# Patient Record
Sex: MALE | URBAN - NONMETROPOLITAN AREA
[De-identification: names, ages, dates, MRNs, and addresses within clinical notes are randomized per-mention and may not be internally consistent; named-entity substitution may affect disease eponyms.]

---

## 2022-02-18 ENCOUNTER — WEB ENCOUNTER (OUTPATIENT)
Dept: URBAN - NONMETROPOLITAN AREA CLINIC 13 | Facility: CLINIC | Age: 52
End: 2022-02-18

## 2022-02-18 ENCOUNTER — OFFICE VISIT (OUTPATIENT)
Dept: URBAN - NONMETROPOLITAN AREA CLINIC 13 | Facility: CLINIC | Age: 52
End: 2022-02-18
Payer: COMMERCIAL

## 2022-02-18 VITALS
TEMPERATURE: 97.5 F | HEART RATE: 77 BPM | WEIGHT: 213 LBS | BODY MASS INDEX: 28.23 KG/M2 | HEIGHT: 73 IN | DIASTOLIC BLOOD PRESSURE: 88 MMHG | SYSTOLIC BLOOD PRESSURE: 130 MMHG

## 2022-02-18 DIAGNOSIS — R53.83 FATIGUE, UNSPECIFIED TYPE: ICD-10-CM

## 2022-02-18 DIAGNOSIS — Z12.11 COLON CANCER SCREENING: ICD-10-CM

## 2022-02-18 DIAGNOSIS — R11.0 NAUSEA: ICD-10-CM

## 2022-02-18 DIAGNOSIS — K21.9 GASTROESOPHAGEAL REFLUX DISEASE, UNSPECIFIED WHETHER ESOPHAGITIS PRESENT: ICD-10-CM

## 2022-02-18 DIAGNOSIS — R74.8 ELEVATED LIVER ENZYMES: ICD-10-CM

## 2022-02-18 DIAGNOSIS — K92.1 MELENA: ICD-10-CM

## 2022-02-18 PROCEDURE — 99244 OFF/OP CNSLTJ NEW/EST MOD 40: CPT | Performed by: INTERNAL MEDICINE

## 2022-02-18 PROCEDURE — 99204 OFFICE O/P NEW MOD 45 MIN: CPT | Performed by: INTERNAL MEDICINE

## 2022-02-18 RX ORDER — ESOMEPRAZOLE MAGNESIUM 20 MG/1
1 CAPSULE CAPSULE, DELAYED RELEASE ORAL ONCE A DAY
Qty: 90 CAPSULE | Refills: 3 | OUTPATIENT
Start: 2022-02-18

## 2022-02-18 RX ORDER — PANTOPRAZOLE SODIUM 20 MG/1
1 TABLET TABLET, DELAYED RELEASE ORAL BID
Status: ACTIVE | COMMUNITY

## 2022-02-18 RX ORDER — ROSUVASTATIN CALCIUM 40 MG/1
1 TABLET TABLET, FILM COATED ORAL ONCE A DAY
Status: ACTIVE | COMMUNITY

## 2022-02-18 NOTE — HPI-TODAY'S VISIT:
Kenn is a 51-year-old male who is referred to me by Dr. Matt Chavez for consultation of nausea, melena, fatigue, and elevated liver enzymes.  A copy of this note will be sent to the referring physician.  In August 2021 he had an MI in the LAD status post stent placement at [place].  He is on Plavix and baby aspirin.  December 20 he developed acute chest pain and went to the emergency department.  His cardiac work-up was negative he was treated with pantoprazole and GI cocktail with relief.  He was discharged and continue to take pantoprazole but had increased symptoms.  His pantoprazole dose was increased to 40 mg daily.  After increasing his dose he had increased fatigue, malaise, and intermittent blurry vision.  He continues to have symptoms that waxed and waned over the month.  He was recommended for EGD but did not feel comfortable discontinuing his Plavix as it had not been a year since his stent placement.  His symptoms of fatigue and malaise became severe in February.  He went to the emergency room twice.  His troponins were elevated however these decreased.  This past Saturday he did test positive for Covid.  His ALT is mildly elevated.  He did have a contrasted CT of his abdomen and pelvis with fatty liver, normal gallbladder, normal pancreas, normal bowel, along with mild constipation.  During this work-up in early February he was noted to have some black stools.  He today his main complaint is fatigue, malaise, dyspepsia was taking Pepto-Bismol initially however he discontinued this and the black stools continued.  His blood work shows no anemia and his BUN is normal..  He is only using the pantoprazole as needed.  He did test positive for Covid this past Saturday.  Today he has multiple systemic complaints and is not feeling well.  MB

## 2022-02-21 LAB
A/G RATIO: 1.9
ACTIN (SMOOTH MUSCLE) ANTIBODY: 8
ALBUMIN: 4.9
ALKALINE PHOSPHATASE: 72
ALPHA-1-ANTITRYPSIN, SERUM: 149
ALT (SGPT): 48
ANA DIRECT: NEGATIVE
AST (SGOT): 28
BASO (ABSOLUTE): 0.1
BASOS: 1
BILIRUBIN, TOTAL: 0.7
BUN/CREATININE RATIO: 6
BUN: 7
CALCIUM: 10
CARBON DIOXIDE, TOTAL: 20
CHLORIDE: 96
CREATININE: 1.14
DEAMIDATED GLIADIN ABS, IGA: 5
DEAMIDATED GLIADIN ABS, IGG: 2
EGFR IF AFRICN AM: 87
EGFR IF NONAFRICN AM: 75
ENDOMYSIAL ANTIBODY IGA: NEGATIVE
EOS (ABSOLUTE): 0.1
EOS: 1
FERRITIN, SERUM: 341
GGT: 34
GLOBULIN, TOTAL: 2.6
GLUCOSE: 102
H PYLORI, IGM ABS: <9
H. PYLORI, IGA ABS: <9
HBSAG SCREEN: NEGATIVE
HCV AB: 0.1
HEMATOCRIT: 46.5
HEMATOLOGY COMMENTS:: (no result)
HEMOGLOBIN: 15.7
HEP A AB, IGM: NEGATIVE
HEP B CORE AB, IGM: NEGATIVE
IMMATURE CELLS: (no result)
IMMATURE GRANS (ABS): 0
IMMATURE GRANULOCYTES: 0
IMMUNOGLOBULIN A, QN, SERUM: 259
INR: 0.9
INTERPRETATION:: (no result)
IRON BIND.CAP.(TIBC): 324
IRON SATURATION: 37
IRON: 121
LYMPHS (ABSOLUTE): 1.4
LYMPHS: 14
MCH: 30.4
MCHC: 33.8
MCV: 90
MITOCHONDRIAL (M2) ANTIBODY: <20
MONOCYTES(ABSOLUTE): 0.8
MONOCYTES: 8
NEUTROPHILS (ABSOLUTE): 7.8
NEUTROPHILS: 76
NRBC: (no result)
PLATELETS: 336
POTASSIUM: 4.6
PROTEIN, TOTAL: 7.5
PROTHROMBIN TIME: 9.8
RBC: 5.17
RDW: 13.1
SEDIMENTATION RATE-WESTERGREN: 4
SODIUM: 135
T-TRANSGLUTAMINASE (TTG) IGA: <2
T-TRANSGLUTAMINASE (TTG) IGG: <2
T4,FREE(DIRECT): 1.57
TSH: 1.67
UIBC: 203
VITAMIN B12: 475
WBC: 10.2

## 2022-02-22 ENCOUNTER — WEB ENCOUNTER (OUTPATIENT)
Dept: URBAN - NONMETROPOLITAN AREA CLINIC 13 | Facility: CLINIC | Age: 52
End: 2022-02-22

## 2022-02-22 ENCOUNTER — TELEPHONE ENCOUNTER (OUTPATIENT)
Dept: URBAN - METROPOLITAN AREA CLINIC 92 | Facility: CLINIC | Age: 52
End: 2022-02-22

## 2022-02-22 ENCOUNTER — TELEPHONE ENCOUNTER (OUTPATIENT)
Dept: URBAN - NONMETROPOLITAN AREA CLINIC 13 | Facility: CLINIC | Age: 52
End: 2022-02-22

## 2022-02-22 RX ORDER — SUCRALFATE 1 G/1
1 TABLET ON AN EMPTY STOMACH - NOT WITH IN 1 HOUR OF OTHER MEDICATIONS TABLET ORAL TWICE A DAY
Qty: 60 TABLETS | Refills: 11 | OUTPATIENT
Start: 2022-02-22 | End: 2023-02-17

## 2022-02-22 RX ORDER — LANSOPRAZOLE 15 MG/1
1 TABLET TABLET, ORALLY DISINTEGRATING, DELAYED RELEASE ORAL BID
Qty: 60 TABLET | Refills: 6 | OUTPATIENT
Start: 2022-02-22

## 2022-02-22 RX ORDER — ESOMEPRAZOLE MAGNESIUM 20 MG/1
1 CAPSULE CAPSULE, DELAYED RELEASE ORAL BID
Qty: 180 CAPSULE | Refills: 3
Start: 2022-02-18

## 2022-02-23 ENCOUNTER — TELEPHONE ENCOUNTER (OUTPATIENT)
Dept: URBAN - NONMETROPOLITAN AREA CLINIC 13 | Facility: CLINIC | Age: 52
End: 2022-02-23

## 2022-02-25 ENCOUNTER — WEB ENCOUNTER (OUTPATIENT)
Dept: URBAN - NONMETROPOLITAN AREA CLINIC 13 | Facility: CLINIC | Age: 52
End: 2022-02-25

## 2022-02-28 ENCOUNTER — WEB ENCOUNTER (OUTPATIENT)
Dept: URBAN - NONMETROPOLITAN AREA CLINIC 13 | Facility: CLINIC | Age: 52
End: 2022-02-28

## 2022-03-01 ENCOUNTER — WEB ENCOUNTER (OUTPATIENT)
Dept: URBAN - NONMETROPOLITAN AREA CLINIC 13 | Facility: CLINIC | Age: 52
End: 2022-03-01

## 2022-03-02 ENCOUNTER — WEB ENCOUNTER (OUTPATIENT)
Dept: URBAN - NONMETROPOLITAN AREA CLINIC 13 | Facility: CLINIC | Age: 52
End: 2022-03-02

## 2022-03-09 ENCOUNTER — WEB ENCOUNTER (OUTPATIENT)
Dept: URBAN - NONMETROPOLITAN AREA CLINIC 13 | Facility: CLINIC | Age: 52
End: 2022-03-09

## 2022-03-18 ENCOUNTER — WEB ENCOUNTER (OUTPATIENT)
Dept: URBAN - NONMETROPOLITAN AREA CLINIC 13 | Facility: CLINIC | Age: 52
End: 2022-03-18

## 2022-03-20 ENCOUNTER — WEB ENCOUNTER (OUTPATIENT)
Dept: URBAN - NONMETROPOLITAN AREA CLINIC 13 | Facility: CLINIC | Age: 52
End: 2022-03-20

## 2022-03-28 ENCOUNTER — WEB ENCOUNTER (OUTPATIENT)
Dept: URBAN - NONMETROPOLITAN AREA CLINIC 13 | Facility: CLINIC | Age: 52
End: 2022-03-28

## 2022-03-28 ENCOUNTER — WEB ENCOUNTER (OUTPATIENT)
Dept: URBAN - NONMETROPOLITAN AREA CLINIC 2 | Facility: CLINIC | Age: 52
End: 2022-03-28

## 2022-03-30 LAB
A/G RATIO: 2
ALBUMIN: 4.5
ALKALINE PHOSPHATASE: 74
ALT (SGPT): 30
AST (SGOT): 26
BASO (ABSOLUTE): 0.1
BASOS: 1
BILIRUBIN, TOTAL: 0.4
BUN/CREATININE RATIO: 7
BUN: 7
CALCIUM: 9.9
CARBON DIOXIDE, TOTAL: 23
CHLORIDE: 94
CREATININE: 1.03
EGFR: 89
EOS (ABSOLUTE): 0.2
EOS: 2
GLOBULIN, TOTAL: 2.3
GLUCOSE: 68
HEMATOCRIT: 42.6
HEMATOLOGY COMMENTS:: (no result)
HEMOGLOBIN: 14.6
IMMATURE CELLS: (no result)
IMMATURE GRANS (ABS): 0.1
IMMATURE GRANULOCYTES: 1
LYMPHS (ABSOLUTE): 1.2
LYMPHS: 13
MCH: 31
MCHC: 34.3
MCV: 90
MONOCYTES(ABSOLUTE): 0.8
MONOCYTES: 8
NEUTROPHILS (ABSOLUTE): 7.1
NEUTROPHILS: 75
NRBC: (no result)
PLATELETS: 297
POTASSIUM: 4.6
PROTEIN, TOTAL: 6.8
RBC: 4.71
RDW: 12.5
SODIUM: 134
WBC: 9.5

## 2022-04-12 ENCOUNTER — TELEPHONE ENCOUNTER (OUTPATIENT)
Dept: URBAN - NONMETROPOLITAN AREA CLINIC 13 | Facility: CLINIC | Age: 52
End: 2022-04-12

## 2022-04-15 ENCOUNTER — OFFICE VISIT (OUTPATIENT)
Dept: URBAN - NONMETROPOLITAN AREA CLINIC 2 | Facility: CLINIC | Age: 52
End: 2022-04-15
Payer: COMMERCIAL

## 2022-04-15 VITALS
BODY MASS INDEX: 28.49 KG/M2 | SYSTOLIC BLOOD PRESSURE: 144 MMHG | WEIGHT: 215 LBS | HEART RATE: 69 BPM | DIASTOLIC BLOOD PRESSURE: 88 MMHG | HEIGHT: 73 IN | TEMPERATURE: 97.5 F

## 2022-04-15 DIAGNOSIS — K92.1 MELENA: ICD-10-CM

## 2022-04-15 DIAGNOSIS — Z12.11 COLON CANCER SCREENING: ICD-10-CM

## 2022-04-15 DIAGNOSIS — R11.0 NAUSEA: ICD-10-CM

## 2022-04-15 DIAGNOSIS — R19.4 CHANGE IN BOWEL HABITS: ICD-10-CM

## 2022-04-15 DIAGNOSIS — R53.83 FATIGUE, UNSPECIFIED TYPE: ICD-10-CM

## 2022-04-15 DIAGNOSIS — K21.9 GASTROESOPHAGEAL REFLUX DISEASE, UNSPECIFIED WHETHER ESOPHAGITIS PRESENT: ICD-10-CM

## 2022-04-15 DIAGNOSIS — R74.8 ELEVATED LIVER ENZYMES: ICD-10-CM

## 2022-04-15 PROCEDURE — 99214 OFFICE O/P EST MOD 30 MIN: CPT | Performed by: NURSE PRACTITIONER

## 2022-04-15 RX ORDER — SUCRALFATE 1 G/1
1 TABLET ON AN EMPTY STOMACH - NOT WITH IN 1 HOUR OF OTHER MEDICATIONS TABLET ORAL TWICE A DAY
Qty: 60 TABLETS | Refills: 11 | Status: ON HOLD | COMMUNITY
Start: 2022-02-22 | End: 2023-02-17

## 2022-04-15 RX ORDER — LANSOPRAZOLE 15 MG/1
1 TABLET TABLET, ORALLY DISINTEGRATING, DELAYED RELEASE ORAL BID
Qty: 60 TABLET | Refills: 6 | Status: ON HOLD | COMMUNITY
Start: 2022-02-22

## 2022-04-15 RX ORDER — ROSUVASTATIN CALCIUM 40 MG/1
1 TABLET TABLET, FILM COATED ORAL ONCE A DAY
Status: ACTIVE | COMMUNITY

## 2022-04-15 RX ORDER — PANTOPRAZOLE SODIUM 20 MG/1
1 TABLET TABLET, DELAYED RELEASE ORAL BID
Status: ACTIVE | COMMUNITY

## 2022-04-15 RX ORDER — ESOMEPRAZOLE MAGNESIUM 20 MG/1
1 CAPSULE CAPSULE, DELAYED RELEASE ORAL BID
Qty: 180 CAPSULE | Refills: 3 | Status: ON HOLD | COMMUNITY
Start: 2022-02-18

## 2022-04-15 NOTE — HPI-TODAY'S VISIT:
Kenn is a 51-year-old male who is referred to me by Dr. Matt Chavez for consultation of nausea, melena, fatigue, and elevated liver enzymes.  A copy of this note will be sent to the referring physician.  In August 2021 he had an MI in the LAD status post stent placement at [place].  He is on Plavix and baby aspirin.  December 20 he developed acute chest pain and went to the emergency department.  His cardiac work-up was negative he was treated with pantoprazole and GI cocktail with relief.  He was discharged and continue to take pantoprazole but had increased symptoms.  His pantoprazole dose was increased to 40 mg daily.  After increasing his dose he had increased fatigue, malaise, and intermittent blurry vision.  He continues to have symptoms that waxed and waned over the month.  He was recommended for EGD but did not feel comfortable discontinuing his Plavix as it had not been a year since his stent placement.  His symptoms of fatigue and malaise became severe in February.  He went to the emergency room twice.  His troponins were elevated however these decreased.  This past Saturday he did test positive for Covid.  His ALT is mildly elevated.  He did have a contrasted CT of his abdomen and pelvis with fatty liver, normal gallbladder, normal pancreas, normal bowel, along with mild constipation.  During this work-up in early February he was noted to have some black stools.  He today his main complaint is fatigue, malaise, dyspepsia was taking Pepto-Bismol initially however he discontinued this and the black stools continued.  His blood work shows no anemia and his BUN is normal..  He is only using the pantoprazole as needed.  He did test positive for Covid this past Saturday.  Today he has multiple systemic complaints and is not feeling well.  MB 4/15/2022 Kenn presents for follow-up of reflux, nausea, abdominal pain, and melena.  Since his last visit we tried multiple PPIs and he felt that he reacted to most of these.  His dyspepsia and epigastric pain improved on high-dose Protonix but he felt that he had systemic symptoms.  Since his last visit he did dose reduce his statin, his fatigue and malaise has improved.  He has had no further melena.  He remains on aspirin and Plavix for coronary artery disease and hopefully going to be able to come off in August 2022.  His bowels are still somewhat irregular.  We have discussed that this may be a side effect of the medications that he has to take for his heart.  Today he is feeling better than he has in some time.  He does plan to establish care locally with cardiology and Dr. Hemphill.  He does want to discuss EGD and colonoscopy at his follow-up visit.  MB

## 2022-06-13 ENCOUNTER — TELEPHONE ENCOUNTER (OUTPATIENT)
Dept: URBAN - NONMETROPOLITAN AREA CLINIC 13 | Facility: CLINIC | Age: 52
End: 2022-06-13

## 2022-06-21 ENCOUNTER — LAB OUTSIDE AN ENCOUNTER (OUTPATIENT)
Dept: URBAN - NONMETROPOLITAN AREA CLINIC 2 | Facility: CLINIC | Age: 52
End: 2022-06-21

## 2022-06-21 ENCOUNTER — OFFICE VISIT (OUTPATIENT)
Dept: URBAN - NONMETROPOLITAN AREA CLINIC 2 | Facility: CLINIC | Age: 52
End: 2022-06-21
Payer: COMMERCIAL

## 2022-06-21 VITALS
HEIGHT: 73 IN | HEART RATE: 62 BPM | TEMPERATURE: 97.6 F | SYSTOLIC BLOOD PRESSURE: 129 MMHG | DIASTOLIC BLOOD PRESSURE: 86 MMHG | WEIGHT: 220 LBS | BODY MASS INDEX: 29.16 KG/M2

## 2022-06-21 DIAGNOSIS — R53.83 FATIGUE, UNSPECIFIED TYPE: ICD-10-CM

## 2022-06-21 DIAGNOSIS — R11.0 NAUSEA: ICD-10-CM

## 2022-06-21 DIAGNOSIS — R74.8 ELEVATED LIVER ENZYMES: ICD-10-CM

## 2022-06-21 DIAGNOSIS — Z12.11 COLON CANCER SCREENING: ICD-10-CM

## 2022-06-21 DIAGNOSIS — R19.4 CHANGE IN BOWEL HABITS: ICD-10-CM

## 2022-06-21 DIAGNOSIS — K21.9 GASTROESOPHAGEAL REFLUX DISEASE, UNSPECIFIED WHETHER ESOPHAGITIS PRESENT: ICD-10-CM

## 2022-06-21 DIAGNOSIS — K92.1 MELENA: ICD-10-CM

## 2022-06-21 PROCEDURE — 99214 OFFICE O/P EST MOD 30 MIN: CPT | Performed by: NURSE PRACTITIONER

## 2022-06-21 RX ORDER — SUCRALFATE 1 G/1
1 TABLET ON AN EMPTY STOMACH - NOT WITH IN 1 HOUR OF OTHER MEDICATIONS TABLET ORAL TWICE A DAY
Qty: 60 TABLETS | Refills: 11 | Status: ON HOLD | COMMUNITY
Start: 2022-02-22 | End: 2023-02-17

## 2022-06-21 RX ORDER — ROSUVASTATIN CALCIUM 40 MG/1
1 TABLET TABLET, FILM COATED ORAL ONCE A DAY
Status: ACTIVE | COMMUNITY

## 2022-06-21 RX ORDER — LANSOPRAZOLE 15 MG/1
1 TABLET TABLET, ORALLY DISINTEGRATING, DELAYED RELEASE ORAL BID
Qty: 60 TABLET | Refills: 6 | Status: ON HOLD | COMMUNITY
Start: 2022-02-22

## 2022-06-21 RX ORDER — ESOMEPRAZOLE MAGNESIUM 20 MG/1
1 CAPSULE CAPSULE, DELAYED RELEASE ORAL BID
Qty: 180 CAPSULE | Refills: 3 | Status: ON HOLD | COMMUNITY
Start: 2022-02-18

## 2022-06-21 RX ORDER — PANTOPRAZOLE SODIUM 40 MG/1
AS DIRECTED TABLET, DELAYED RELEASE ORAL BID
Status: ACTIVE | COMMUNITY

## 2022-06-21 NOTE — HPI-TODAY'S VISIT:
Kenn is a 51-year-old male who is referred to me by Dr. Matt Chavez for consultation of nausea, melena, fatigue, and elevated liver enzymes.  A copy of this note will be sent to the referring physician.  In August 2021 he had an MI in the LAD status post stent placement at [place].  He is on Plavix and baby aspirin.  December 20 he developed acute chest pain and went to the emergency department.  His cardiac work-up was negative he was treated with pantoprazole and GI cocktail with relief.  He was discharged and continue to take pantoprazole but had increased symptoms.  His pantoprazole dose was increased to 40 mg daily.  After increasing his dose he had increased fatigue, malaise, and intermittent blurry vision.  He continues to have symptoms that waxed and waned over the month.  He was recommended for EGD but did not feel comfortable discontinuing his Plavix as it had not been a year since his stent placement.  His symptoms of fatigue and malaise became severe in February.  He went to the emergency room twice.  His troponins were elevated however these decreased.  This past Saturday he did test positive for Covid.  His ALT is mildly elevated.  He did have a contrasted CT of his abdomen and pelvis with fatty liver, normal gallbladder, normal pancreas, normal bowel, along with mild constipation.  During this work-up in early February he was noted to have some black stools.  He today his main complaint is fatigue, malaise, dyspepsia was taking Pepto-Bismol initially however he discontinued this and the black stools continued.  His blood work shows no anemia and his BUN is normal..  He is only using the pantoprazole as needed.  He did test positive for Covid this past Saturday.  Today he has multiple systemic complaints and is not feeling well.  MB 4/15/2022 Kenn presents for follow-up of reflux, nausea, abdominal pain, and melena.  Since his last visit we tried multiple PPIs and he felt that he reacted to most of these.  His dyspepsia and epigastric pain improved on high-dose Protonix but he felt that he had systemic symptoms.  Since his last visit he did dose reduce his statin, his fatigue and malaise has improved.  He has had no further melena.  He remains on aspirin and Plavix for coronary artery disease and hopefully going to be able to come off in August 2022.  His bowels are still somewhat irregular.  We have discussed that this may be a side effect of the medications that he has to take for his heart.  Today he is feeling better than he has in some time.  He does plan to establish care locally with cardiology and Dr. Hemphill.  He does want to discuss EGD and colonoscopy at his follow-up visit.  MB 6/21/2022 Kenn presents for evaluation of increased epigastric abdominal pain and reflux.  A month ago he developed a sinus infection.  He was taking over-the-counter sinus medications and developed increased epigastric abdominal pain and dizziness.  He was finally treated with antibiotics for his sinus infection but he continues to have epigastric discomfort.  He started pantoprazole 40 mg daily and over the past 3 days has increased to twice daily.  He does think this is helped his symptoms.  He does report increased fatigue and dizziness on the higher dose.  He is 2 months away from his 1 year anniversary of his stent placement.  He does not want to consider pursuing upper endoscopy given his chronic upper GI complaints.  He agrees to wean the pantoprazole to 20 mg twice daily to try and reduce the dizziness and proceed with EGD once Dr. Hemphill has cleared him to come off of his Plavix.  MB

## 2022-06-22 ENCOUNTER — TELEPHONE ENCOUNTER (OUTPATIENT)
Dept: URBAN - METROPOLITAN AREA CLINIC 92 | Facility: CLINIC | Age: 52
End: 2022-06-22

## 2022-06-22 ENCOUNTER — WEB ENCOUNTER (OUTPATIENT)
Dept: URBAN - NONMETROPOLITAN AREA CLINIC 2 | Facility: CLINIC | Age: 52
End: 2022-06-22

## 2022-06-22 ENCOUNTER — TELEPHONE ENCOUNTER (OUTPATIENT)
Dept: URBAN - NONMETROPOLITAN AREA CLINIC 13 | Facility: CLINIC | Age: 52
End: 2022-06-22

## 2022-06-22 LAB
A/G RATIO: 2
ALBUMIN: 4.7
ALKALINE PHOSPHATASE: 65
ALT (SGPT): 25
AST (SGOT): 20
BASO (ABSOLUTE): 0.1
BASOS: 1
BILIRUBIN, TOTAL: 0.8
BUN/CREATININE RATIO: 8
BUN: 10
C-REACTIVE PROTEIN, QUANT: <1
CALCIUM: 9.9
CARBON DIOXIDE, TOTAL: 25
CHLORIDE: 99
CREATININE: 1.27
EGFR: 69
EOS (ABSOLUTE): 0.5
EOS: 4
GLOBULIN, TOTAL: 2.3
GLUCOSE: 86
HEMATOCRIT: 46.7
HEMATOLOGY COMMENTS:: (no result)
HEMOGLOBIN: 15.2
IMMATURE CELLS: (no result)
IMMATURE GRANS (ABS): 0
IMMATURE GRANULOCYTES: 0
LYMPHS (ABSOLUTE): 1.3
LYMPHS: 11
MCH: 30.1
MCHC: 32.5
MCV: 93
MONOCYTES(ABSOLUTE): 0.7
MONOCYTES: 6
NEUTROPHILS (ABSOLUTE): 9.2
NEUTROPHILS: 78
NRBC: (no result)
PLATELETS: 293
POTASSIUM: 4.8
PROTEIN, TOTAL: 7
RBC: 5.05
RDW: 12
SEDIMENTATION RATE-WESTERGREN: 2
SODIUM: 137
WBC: 11.7

## 2022-07-06 ENCOUNTER — WEB ENCOUNTER (OUTPATIENT)
Dept: URBAN - NONMETROPOLITAN AREA CLINIC 2 | Facility: CLINIC | Age: 52
End: 2022-07-06

## 2022-07-21 ENCOUNTER — WEB ENCOUNTER (OUTPATIENT)
Dept: URBAN - NONMETROPOLITAN AREA CLINIC 2 | Facility: CLINIC | Age: 52
End: 2022-07-21

## 2022-09-16 ENCOUNTER — OFFICE VISIT (OUTPATIENT)
Dept: URBAN - NONMETROPOLITAN AREA CLINIC 2 | Facility: CLINIC | Age: 52
End: 2022-09-16

## 2022-10-25 ENCOUNTER — OFFICE VISIT (OUTPATIENT)
Dept: URBAN - NONMETROPOLITAN AREA SURGERY CENTER 1 | Facility: SURGERY CENTER | Age: 52
End: 2022-10-25
Payer: COMMERCIAL

## 2022-10-25 DIAGNOSIS — K31.89 ACQUIRED DEFORMITY OF DUODENUM: ICD-10-CM

## 2022-10-25 DIAGNOSIS — K22.89 DILATATION OF ESOPHAGUS: ICD-10-CM

## 2022-10-25 PROCEDURE — G8907 PT DOC NO EVENTS ON DISCHARG: HCPCS | Performed by: INTERNAL MEDICINE

## 2022-10-25 PROCEDURE — 43239 EGD BIOPSY SINGLE/MULTIPLE: CPT | Performed by: INTERNAL MEDICINE

## 2022-12-06 ENCOUNTER — OFFICE VISIT (OUTPATIENT)
Dept: URBAN - NONMETROPOLITAN AREA CLINIC 2 | Facility: CLINIC | Age: 52
End: 2022-12-06

## 2023-02-14 ENCOUNTER — TELEPHONE ENCOUNTER (OUTPATIENT)
Dept: URBAN - NONMETROPOLITAN AREA CLINIC 2 | Facility: CLINIC | Age: 53
End: 2023-02-14

## 2023-02-20 ENCOUNTER — TELEPHONE ENCOUNTER (OUTPATIENT)
Dept: URBAN - NONMETROPOLITAN AREA CLINIC 2 | Facility: CLINIC | Age: 53
End: 2023-02-20

## 2023-02-21 LAB
ALBUMIN/GLOBULIN RATIO: 2
ALBUMIN: 4.3
ALKALINE PHOSPHATASE: 69
ALT (SGPT): 38
AST (SGOT): 26
BILIRUBIN, DIRECT: 0.1
BILIRUBIN, INDIRECT: 0.2
BILIRUBIN, TOTAL: 0.3
GLOBULIN: 2.2
PROTEIN, TOTAL: 6.5

## 2023-02-27 ENCOUNTER — OFFICE VISIT (OUTPATIENT)
Dept: URBAN - NONMETROPOLITAN AREA CLINIC 2 | Facility: CLINIC | Age: 53
End: 2023-02-27
Payer: COMMERCIAL

## 2023-02-27 ENCOUNTER — LAB OUTSIDE AN ENCOUNTER (OUTPATIENT)
Dept: URBAN - NONMETROPOLITAN AREA CLINIC 2 | Facility: CLINIC | Age: 53
End: 2023-02-27

## 2023-02-27 VITALS
TEMPERATURE: 97.5 F | SYSTOLIC BLOOD PRESSURE: 135 MMHG | WEIGHT: 225 LBS | HEIGHT: 73 IN | HEART RATE: 64 BPM | BODY MASS INDEX: 29.82 KG/M2 | DIASTOLIC BLOOD PRESSURE: 91 MMHG

## 2023-02-27 DIAGNOSIS — R19.4 CHANGE IN BOWEL HABITS: ICD-10-CM

## 2023-02-27 DIAGNOSIS — R11.0 NAUSEA: ICD-10-CM

## 2023-02-27 DIAGNOSIS — K21.9 GASTROESOPHAGEAL REFLUX DISEASE, UNSPECIFIED WHETHER ESOPHAGITIS PRESENT: ICD-10-CM

## 2023-02-27 DIAGNOSIS — Z12.11 COLON CANCER SCREENING: ICD-10-CM

## 2023-02-27 DIAGNOSIS — R74.8 ELEVATED LIVER ENZYMES: ICD-10-CM

## 2023-02-27 DIAGNOSIS — K92.1 MELENA: ICD-10-CM

## 2023-02-27 DIAGNOSIS — R53.83 FATIGUE, UNSPECIFIED TYPE: ICD-10-CM

## 2023-02-27 PROBLEM — 707724006: Status: ACTIVE | Noted: 2022-02-18

## 2023-02-27 PROBLEM — 84229001: Status: ACTIVE | Noted: 2022-02-18

## 2023-02-27 PROBLEM — 2901004: Status: ACTIVE | Noted: 2022-02-18

## 2023-02-27 PROCEDURE — 99214 OFFICE O/P EST MOD 30 MIN: CPT | Performed by: NURSE PRACTITIONER

## 2023-02-27 RX ORDER — ROSUVASTATIN CALCIUM 40 MG/1
1 TABLET TABLET, FILM COATED ORAL ONCE A DAY
Status: ACTIVE | COMMUNITY

## 2023-02-27 RX ORDER — ESOMEPRAZOLE MAGNESIUM 20 MG/1
1 CAPSULE CAPSULE, DELAYED RELEASE ORAL BID
Qty: 180 CAPSULE | Refills: 3 | Status: ON HOLD | COMMUNITY
Start: 2022-02-18

## 2023-02-27 RX ORDER — LANSOPRAZOLE 15 MG/1
1 TABLET TABLET, ORALLY DISINTEGRATING, DELAYED RELEASE ORAL BID
Qty: 60 TABLET | Refills: 6 | Status: ON HOLD | COMMUNITY
Start: 2022-02-22

## 2023-02-27 RX ORDER — SODIUM PICOSULFATE, MAGNESIUM OXIDE, AND ANHYDROUS CITRIC ACID 10; 3.5; 12 MG/160ML; G/160ML; G/160ML
160ML X 2 AS DIRECTED LIQUID ORAL ONCE
Qty: 320 MILLILITER | Refills: 0 | OUTPATIENT
Start: 2023-02-27 | End: 2023-02-28

## 2023-02-27 RX ORDER — PANTOPRAZOLE SODIUM 40 MG/1
1 TABLET TABLET, DELAYED RELEASE ORAL TWICE A DAY
Qty: 60 TABLET | Refills: 3 | OUTPATIENT
Start: 2023-02-27

## 2023-02-27 NOTE — HPI-TODAY'S VISIT:
Kenn is a 51-year-old male who is referred to me by Dr. Matt Chavez for consultation of nausea, melena, fatigue, and elevated liver enzymes.  A copy of this note will be sent to the referring physician.  In August 2021 he had an MI in the LAD status post stent placement at [place].  He is on Plavix and baby aspirin.  December 20 he developed acute chest pain and went to the emergency department.  His cardiac work-up was negative he was treated with pantoprazole and GI cocktail with relief.  He was discharged and continue to take pantoprazole but had increased symptoms.  His pantoprazole dose was increased to 40 mg daily.  After increasing his dose he had increased fatigue, malaise, and intermittent blurry vision.  He continues to have symptoms that waxed and waned over the month.  He was recommended for EGD but did not feel comfortable discontinuing his Plavix as it had not been a year since his stent placement.  His symptoms of fatigue and malaise became severe in February.  He went to the emergency room twice.  His troponins were elevated however these decreased.  This past Saturday he did test positive for Covid.  His ALT is mildly elevated.  He did have a contrasted CT of his abdomen and pelvis with fatty liver, normal gallbladder, normal pancreas, normal bowel, along with mild constipation.  During this work-up in early February he was noted to have some black stools.  He today his main complaint is fatigue, malaise, dyspepsia was taking Pepto-Bismol initially however he discontinued this and the black stools continued.  His blood work shows no anemia and his BUN is normal..  He is only using the pantoprazole as needed.  He did test positive for Covid this past Saturday.  Today he has multiple systemic complaints and is not feeling well.  MB 4/15/2022 Kenn presents for follow-up of reflux, nausea, abdominal pain, and melena.  Since his last visit we tried multiple PPIs and he felt that he reacted to most of these.  His dyspepsia and epigastric pain improved on high-dose Protonix but he felt that he had systemic symptoms.  Since his last visit he did dose reduce his statin, his fatigue and malaise has improved.  He has had no further melena.  He remains on aspirin and Plavix for coronary artery disease and hopefully going to be able to come off in August 2022.  His bowels are still somewhat irregular.  We have discussed that this may be a side effect of the medications that he has to take for his heart.  Today he is feeling better than he has in some time.  He does plan to establish care locally with cardiology and Dr. Hemphill.  He does want to discuss EGD and colonoscopy at his follow-up visit.  MB 6/21/2022 Kenn presents for evaluation of increased epigastric abdominal pain and reflux.  A month ago he developed a sinus infection.  He was taking over-the-counter sinus medications and developed increased epigastric abdominal pain and dizziness.  He was finally treated with antibiotics for his sinus infection but he continues to have epigastric discomfort.  He started pantoprazole 40 mg daily and over the past 3 days has increased to twice daily.  He does think this is helped his symptoms.  He does report increased fatigue and dizziness on the higher dose.  He is 2 months away from his 1 year anniversary of his stent placement.  He does not want to consider pursuing upper endoscopy given his chronic upper GI complaints.  He agrees to wean the pantoprazole to 20 mg twice daily to try and reduce the dizziness and proceed with EGD once Dr. Hemphill has cleared him to come off of his Plavix.  MB 2/27/2023 Kenn presents for endoscopy follow-up.  Since his last visit he underwent endoscopy in the fall 2022.  This reveals mild gastritis.  He was down to pantoprazole 20 mg daily.  This medication gives him severe fatigue but it normalizes his bowels.  He got another sinus infection which seems to always give him significant GI upset, he flared with gastritis symptoms including epigastric abdominal pain belching and reflux.  He is back on pantoprazole 40 mg twice daily with improvement in his GI symptoms but severe brain fog.  He states when he is off PPI his bowels are loose, when he is on PPI his bowels move daily.  Today his main complaint is dyspepsia which he relates to antibiotics from his most recent sinus infection.  He has not seen an ENT for recurrent sinusitis.  He has never had a colonoscopy in the past.  We will schedule colonoscopy with intubation of his TI and random biopsies given his bowel irregularity off of PPI.  We have discussed that typically PPI causes diarrhea.  Overall he continues to struggle with his chronic GI complaints in particular dyspepsia and bowel irregularity.  He does still have his gallbladder.  MB

## 2023-03-29 ENCOUNTER — ERX REFILL RESPONSE (OUTPATIENT)
Dept: URBAN - NONMETROPOLITAN AREA CLINIC 2 | Facility: CLINIC | Age: 53
End: 2023-03-29

## 2023-03-29 RX ORDER — PANTOPRAZOLE SODIUM 40 MG/1
1 TABLET TABLET, DELAYED RELEASE ORAL ONCE A DAY
Qty: 90 TABLET | Refills: 3 | OUTPATIENT

## 2023-03-29 RX ORDER — PANTOPRAZOLE SODIUM 40 MG/1
1 TABLET TABLET, DELAYED RELEASE ORAL TWICE A DAY
Qty: 60 TABLET | Refills: 3 | OUTPATIENT

## 2023-03-30 ENCOUNTER — OFFICE VISIT (OUTPATIENT)
Dept: URBAN - METROPOLITAN AREA MEDICAL CENTER 1 | Facility: MEDICAL CENTER | Age: 53
End: 2023-03-30

## 2023-04-10 ENCOUNTER — OFFICE VISIT (OUTPATIENT)
Dept: URBAN - NONMETROPOLITAN AREA CLINIC 13 | Facility: CLINIC | Age: 53
End: 2023-04-10

## 2023-05-31 ENCOUNTER — TELEPHONE ENCOUNTER (OUTPATIENT)
Dept: URBAN - NONMETROPOLITAN AREA CLINIC 2 | Facility: CLINIC | Age: 53
End: 2023-05-31

## 2023-05-31 ENCOUNTER — LAB OUTSIDE AN ENCOUNTER (OUTPATIENT)
Dept: URBAN - NONMETROPOLITAN AREA CLINIC 2 | Facility: CLINIC | Age: 53
End: 2023-05-31

## 2023-06-01 LAB
A/G RATIO: 2.1
ALBUMIN: 4.5
ALKALINE PHOSPHATASE: 78
ALT (SGPT): 22
AST (SGOT): 19
BASO (ABSOLUTE): 0.1
BASOS: 1
BILIRUBIN, TOTAL: 0.3
BUN/CREATININE RATIO: 11
BUN: 13
CALCIUM: 9.4
CARBON DIOXIDE, TOTAL: 24
CHLORIDE: 100
CREATININE: 1.2
EGFR: 74
EOS (ABSOLUTE): 0.3
EOS: 3
GLOBULIN, TOTAL: 2.1
GLUCOSE: 101
HEMATOCRIT: 45.9
HEMATOLOGY COMMENTS:: (no result)
HEMOGLOBIN: 15.6
IMMATURE CELLS: (no result)
IMMATURE GRANS (ABS): 0
IMMATURE GRANULOCYTES: 0
LYMPHS (ABSOLUTE): 1.8
LYMPHS: 18
MCH: 30.8
MCHC: 34
MCV: 91
MONOCYTES(ABSOLUTE): 0.7
MONOCYTES: 8
NEUTROPHILS (ABSOLUTE): 6.7
NEUTROPHILS: 70
NRBC: (no result)
PLATELETS: 277
POTASSIUM: 4.9
PROTEIN, TOTAL: 6.6
RBC: 5.06
RDW: 12.6
SODIUM: 139
WBC: 9.6

## 2023-06-06 ENCOUNTER — TELEPHONE ENCOUNTER (OUTPATIENT)
Dept: URBAN - NONMETROPOLITAN AREA CLINIC 2 | Facility: CLINIC | Age: 53
End: 2023-06-06

## 2023-06-06 ENCOUNTER — WEB ENCOUNTER (OUTPATIENT)
Dept: URBAN - NONMETROPOLITAN AREA CLINIC 2 | Facility: CLINIC | Age: 53
End: 2023-06-06

## 2023-06-08 ENCOUNTER — OFFICE VISIT (OUTPATIENT)
Dept: URBAN - METROPOLITAN AREA MEDICAL CENTER 1 | Facility: MEDICAL CENTER | Age: 53
End: 2023-06-08
Payer: COMMERCIAL

## 2023-06-08 ENCOUNTER — LAB OUTSIDE AN ENCOUNTER (OUTPATIENT)
Dept: URBAN - NONMETROPOLITAN AREA CLINIC 2 | Facility: CLINIC | Age: 53
End: 2023-06-08

## 2023-06-08 DIAGNOSIS — R19.7 ACUTE DIARRHEA: ICD-10-CM

## 2023-06-08 DIAGNOSIS — D12.2 ADENOMA OF ASCENDING COLON: ICD-10-CM

## 2023-06-08 PROCEDURE — 45385 COLONOSCOPY W/LESION REMOVAL: CPT | Performed by: INTERNAL MEDICINE

## 2023-06-08 PROCEDURE — 45380 COLONOSCOPY AND BIOPSY: CPT | Performed by: INTERNAL MEDICINE

## 2023-06-09 LAB
AP CASE REPORT: (no result)
AP FINAL DIAGNOSIS: (no result)
AP GROSS DESCRIPTION: (no result)
AP MICROSCOPIC DESCRIPTION: (no result)

## 2023-08-21 ENCOUNTER — OFFICE VISIT (OUTPATIENT)
Dept: URBAN - NONMETROPOLITAN AREA CLINIC 2 | Facility: CLINIC | Age: 53
End: 2023-08-21
Payer: COMMERCIAL

## 2023-08-21 VITALS
DIASTOLIC BLOOD PRESSURE: 82 MMHG | SYSTOLIC BLOOD PRESSURE: 136 MMHG | HEART RATE: 77 BPM | HEIGHT: 73 IN | WEIGHT: 222 LBS | BODY MASS INDEX: 29.42 KG/M2 | TEMPERATURE: 98.8 F

## 2023-08-21 DIAGNOSIS — Z12.11 COLON CANCER SCREENING: ICD-10-CM

## 2023-08-21 DIAGNOSIS — R53.83 FATIGUE, UNSPECIFIED TYPE: ICD-10-CM

## 2023-08-21 DIAGNOSIS — K92.1 MELENA: ICD-10-CM

## 2023-08-21 DIAGNOSIS — R19.4 CHANGE IN BOWEL HABITS: ICD-10-CM

## 2023-08-21 DIAGNOSIS — R11.0 NAUSEA: ICD-10-CM

## 2023-08-21 DIAGNOSIS — R74.8 ELEVATED LIVER ENZYMES: ICD-10-CM

## 2023-08-21 DIAGNOSIS — K21.9 GASTROESOPHAGEAL REFLUX DISEASE, UNSPECIFIED WHETHER ESOPHAGITIS PRESENT: ICD-10-CM

## 2023-08-21 DIAGNOSIS — R10.11 RUQ ABDOMINAL PAIN: ICD-10-CM

## 2023-08-21 PROBLEM — 305058001: Status: ACTIVE | Noted: 2022-02-18

## 2023-08-21 PROBLEM — 129851009: Status: ACTIVE | Noted: 2022-03-28

## 2023-08-21 PROBLEM — 301717006: Status: ACTIVE | Noted: 2023-05-31

## 2023-08-21 PROCEDURE — 99214 OFFICE O/P EST MOD 30 MIN: CPT | Performed by: NURSE PRACTITIONER

## 2023-08-21 RX ORDER — PANTOPRAZOLE SODIUM 40 MG/1
1 TABLET TABLET, DELAYED RELEASE ORAL ONCE A DAY
Qty: 90 TABLET | Refills: 3 | Status: ACTIVE | COMMUNITY

## 2023-08-21 RX ORDER — ROSUVASTATIN CALCIUM 40 MG/1
1 TABLET TABLET, FILM COATED ORAL ONCE A DAY
Status: ACTIVE | COMMUNITY

## 2023-08-21 RX ORDER — ESOMEPRAZOLE MAGNESIUM 20 MG/1
1 CAPSULE CAPSULE, DELAYED RELEASE ORAL BID
Qty: 180 CAPSULE | Refills: 3 | Status: ON HOLD | COMMUNITY
Start: 2022-02-18

## 2023-08-21 RX ORDER — FAMOTIDINE 20 MG/1
1 TABLET TABLET ORAL TWICE DAILY
Qty: 180 TABLET | Refills: 3 | OUTPATIENT
Start: 2023-08-21

## 2023-08-21 RX ORDER — LANSOPRAZOLE 15 MG/1
1 TABLET TABLET, ORALLY DISINTEGRATING, DELAYED RELEASE ORAL BID
Qty: 60 TABLET | Refills: 6 | Status: ON HOLD | COMMUNITY
Start: 2022-02-22

## 2023-08-21 NOTE — HPI-TODAY'S VISIT:
Kenn is a 51-year-old male who is referred to me by Dr. Matt Chavez for consultation of nausea, melena, fatigue, and elevated liver enzymes.  A copy of this note will be sent to the referring physician.  In August 2021 he had an MI in the LAD status post stent placement at [place].  He is on Plavix and baby aspirin.  December 20 he developed acute chest pain and went to the emergency department.  His cardiac work-up was negative he was treated with pantoprazole and GI cocktail with relief.  He was discharged and continue to take pantoprazole but had increased symptoms.  His pantoprazole dose was increased to 40 mg daily.  After increasing his dose he had increased fatigue, malaise, and intermittent blurry vision.  He continues to have symptoms that waxed and waned over the month.  He was recommended for EGD but did not feel comfortable discontinuing his Plavix as it had not been a year since his stent placement.  His symptoms of fatigue and malaise became severe in February.  He went to the emergency room twice.  His troponins were elevated however these decreased.  This past Saturday he did test positive for Covid.  His ALT is mildly elevated.  He did have a contrasted CT of his abdomen and pelvis with fatty liver, normal gallbladder, normal pancreas, normal bowel, along with mild constipation.  During this work-up in early February he was noted to have some black stools.  He today his main complaint is fatigue, malaise, dyspepsia was taking Pepto-Bismol initially however he discontinued this and the black stools continued.  His blood work shows no anemia and his BUN is normal..  He is only using the pantoprazole as needed.  He did test positive for Covid this past Saturday.  Today he has multiple systemic complaints and is not feeling well.  MB 4/15/2022 Kenn presents for follow-up of reflux, nausea, abdominal pain, and melena.  Since his last visit we tried multiple PPIs and he felt that he reacted to most of these.  His dyspepsia and epigastric pain improved on high-dose Protonix but he felt that he had systemic symptoms.  Since his last visit he did dose reduce his statin, his fatigue and malaise has improved.  He has had no further melena.  He remains on aspirin and Plavix for coronary artery disease and hopefully going to be able to come off in August 2022.  His bowels are still somewhat irregular.  We have discussed that this may be a side effect of the medications that he has to take for his heart.  Today he is feeling better than he has in some time.  He does plan to establish care locally with cardiology and Dr. Hemphill.  He does want to discuss EGD and colonoscopy at his follow-up visit.  MB 6/21/2022 Kenn presents for evaluation of increased epigastric abdominal pain and reflux.  A month ago he developed a sinus infection.  He was taking over-the-counter sinus medications and developed increased epigastric abdominal pain and dizziness.  He was finally treated with antibiotics for his sinus infection but he continues to have epigastric discomfort.  He started pantoprazole 40 mg daily and over the past 3 days has increased to twice daily.  He does think this is helped his symptoms.  He does report increased fatigue and dizziness on the higher dose.  He is 2 months away from his 1 year anniversary of his stent placement.  He does not want to consider pursuing upper endoscopy given his chronic upper GI complaints.  He agrees to wean the pantoprazole to 20 mg twice daily to try and reduce the dizziness and proceed with EGD once Dr. Hemphill has cleared him to come off of his Plavix.  MB 2/27/2023 Kenn presents for endoscopy follow-up.  Since his last visit he underwent endoscopy in the fall 2022.  This reveals mild gastritis.  He was down to pantoprazole 20 mg daily.  This medication gives him severe fatigue but it normalizes his bowels.  He got another sinus infection which seems to always give him significant GI upset, he flared with gastritis symptoms including epigastric abdominal pain belching and reflux.  He is back on pantoprazole 40 mg twice daily with improvement in his GI symptoms but severe brain fog.  He states when he is off PPI his bowels are loose, when he is on PPI his bowels move daily.  Today his main complaint is dyspepsia which he relates to antibiotics from his most recent sinus infection.  He has not seen an ENT for recurrent sinusitis.  He has never had a colonoscopy in the past.  We will schedule colonoscopy with intubation of his TI and random biopsies given his bowel irregularity off of PPI.  We have discussed that typically PPI causes diarrhea.  Overall he continues to struggle with his chronic GI complaints in particular dyspepsia and bowel irregularity.  He does still have his gallbladder.  MB 8/21/2023 Kenn presents for follow-up of reflux, gastritis, right upper quadrant abdominal pain, and colon cancer screening.  Since his last visit his colonoscopy reveals 1 precancerous polyp and normal random biopsies, he does have left-sided diverticulosis.  His bowels have improved.  He has had intermittent right upper quadrant abdominal pain.  His CBC CMP and ultrasound are normal since his last visit.  He had another flare of an upper respiratory infection which flares his reflux.  He increased his pantoprazole to 40 mg twice daily but this causes lower extremity edema and severe muscle aches.  He is back down to 20 mg twice daily but having breakthrough reflux, he has failed all other PPI therapy including as omeprazole, lansoprazole, omeprazole.  He does agree to start famotidine 20 mg twice daily before lunch and bedtime.  We have discussed elevating the head of his bed as refluxing at night as his main complaint.  Today he continues to struggle with his GI complaints.  He does follow with Dr. Hemphill, they have discussed an alternative to aspirin therapy which is an injection.  He does want to consider this as he feels like the majority of his upper GI complaints have been driven by aspirin therapy since his MI.  Today he is doing well otherwise with no new GI complaints.  MB

## 2023-12-05 ENCOUNTER — WEB ENCOUNTER (OUTPATIENT)
Dept: URBAN - NONMETROPOLITAN AREA CLINIC 2 | Facility: CLINIC | Age: 53
End: 2023-12-05

## 2023-12-07 LAB
A/G RATIO: 2.1
ALBUMIN: 4.5
ALKALINE PHOSPHATASE: 74
ALT (SGPT): 31
AST (SGOT): 23
BASO (ABSOLUTE): 0.1
BASOS: 1
BILIRUBIN, TOTAL: 0.4
BUN/CREATININE RATIO: 8
BUN: 10
C-REACTIVE PROTEIN, QUANT: <1
CALCIUM: 10
CARBON DIOXIDE, TOTAL: 27
CHLORIDE: 98
CREATININE: 1.25
EGFR: 70
EOS (ABSOLUTE): 0.2
EOS: 3
GLOBULIN, TOTAL: 2.1
GLUCOSE: 74
HEMATOCRIT: 46
HEMATOLOGY COMMENTS:: (no result)
HEMOGLOBIN: 15.5
IMMATURE CELLS: (no result)
IMMATURE GRANS (ABS): 0
IMMATURE GRANULOCYTES: 0
LYMPHS (ABSOLUTE): 1.7
LYMPHS: 20
MCH: 30.6
MCHC: 33.7
MCV: 91
MONOCYTES(ABSOLUTE): 0.7
MONOCYTES: 9
NEUTROPHILS (ABSOLUTE): 5.8
NEUTROPHILS: 67
NRBC: (no result)
PLATELETS: 277
POTASSIUM: 4.8
PROTEIN, TOTAL: 6.6
RBC: 5.07
RDW: 12.6
SEDIMENTATION RATE-WESTERGREN: 12
SODIUM: 138
WBC: 8.5

## 2023-12-27 ENCOUNTER — WEB ENCOUNTER (OUTPATIENT)
Dept: URBAN - NONMETROPOLITAN AREA CLINIC 2 | Facility: CLINIC | Age: 53
End: 2023-12-27

## 2023-12-27 RX ORDER — FAMOTIDINE 40 MG/1
1 TABLET AT BEDTIME TABLET, FILM COATED ORAL ONCE A DAY
Qty: 90 TABLET | Refills: 3
Start: 2023-08-21

## 2024-02-12 ENCOUNTER — OV EP (OUTPATIENT)
Dept: URBAN - NONMETROPOLITAN AREA CLINIC 2 | Facility: CLINIC | Age: 54
End: 2024-02-12
Payer: COMMERCIAL

## 2024-02-12 VITALS
HEIGHT: 73 IN | WEIGHT: 215 LBS | DIASTOLIC BLOOD PRESSURE: 87 MMHG | BODY MASS INDEX: 28.49 KG/M2 | TEMPERATURE: 98.7 F | SYSTOLIC BLOOD PRESSURE: 125 MMHG | HEART RATE: 77 BPM

## 2024-02-12 DIAGNOSIS — R19.4 CHANGE IN BOWEL HABITS: ICD-10-CM

## 2024-02-12 DIAGNOSIS — K21.9 GASTROESOPHAGEAL REFLUX DISEASE, UNSPECIFIED WHETHER ESOPHAGITIS PRESENT: ICD-10-CM

## 2024-02-12 DIAGNOSIS — R11.0 NAUSEA: ICD-10-CM

## 2024-02-12 DIAGNOSIS — R53.83 FATIGUE, UNSPECIFIED TYPE: ICD-10-CM

## 2024-02-12 DIAGNOSIS — R74.8 ELEVATED LIVER ENZYMES: ICD-10-CM

## 2024-02-12 DIAGNOSIS — K92.1 MELENA: ICD-10-CM

## 2024-02-12 DIAGNOSIS — R10.11 RUQ ABDOMINAL PAIN: ICD-10-CM

## 2024-02-12 DIAGNOSIS — Z12.11 COLON CANCER SCREENING: ICD-10-CM

## 2024-02-12 PROBLEM — 422587007: Status: ACTIVE | Noted: 2022-02-18

## 2024-02-12 PROBLEM — 235595009: Status: ACTIVE | Noted: 2022-02-18

## 2024-02-12 PROCEDURE — 99214 OFFICE O/P EST MOD 30 MIN: CPT | Performed by: NURSE PRACTITIONER

## 2024-02-12 RX ORDER — LANSOPRAZOLE 15 MG/1
1 TABLET TABLET, ORALLY DISINTEGRATING, DELAYED RELEASE ORAL BID
Qty: 60 TABLET | Refills: 6 | Status: ON HOLD | COMMUNITY
Start: 2022-02-22

## 2024-02-12 RX ORDER — ROSUVASTATIN CALCIUM 40 MG/1
1 TABLET TABLET, FILM COATED ORAL ONCE A DAY
Status: ACTIVE | COMMUNITY

## 2024-02-12 RX ORDER — PANTOPRAZOLE SODIUM 40 MG/1
1 TABLET TABLET, DELAYED RELEASE ORAL ONCE A DAY
Qty: 90 TABLET | Refills: 3 | Status: ON HOLD | COMMUNITY

## 2024-02-12 RX ORDER — LANSOPRAZOLE 30 MG/1
1 CAPSULE BEFORE A MEAL CAPSULE, DELAYED RELEASE ORAL TWICE DAILY
Qty: 180 CAPSULES | Refills: 3
Start: 2022-02-22

## 2024-02-12 RX ORDER — ESOMEPRAZOLE MAGNESIUM 20 MG/1
1 CAPSULE CAPSULE, DELAYED RELEASE ORAL BID
Qty: 180 CAPSULE | Refills: 3 | Status: ON HOLD | COMMUNITY
Start: 2022-02-18

## 2024-02-12 RX ORDER — LANSOPRAZOLE 15 MG/1
1 CAPSULE BEFORE A MEAL CAPSULE, DELAYED RELEASE ORAL
Status: ACTIVE | COMMUNITY

## 2024-02-12 RX ORDER — FAMOTIDINE 40 MG/1
1 TABLET AT BEDTIME TABLET, FILM COATED ORAL ONCE A DAY
Qty: 90 TABLET | Refills: 3 | Status: ACTIVE | COMMUNITY
Start: 2023-08-21

## 2024-02-12 NOTE — HPI-TODAY'S VISIT:
Kenn is a 51-year-old male who is referred to me by Dr. Matt Chavez for consultation of nausea, melena, fatigue, and elevated liver enzymes.  A copy of this note will be sent to the referring physician.  In August 2021 he had an MI in the LAD status post stent placement at [facility].  He is on Plavix and baby aspirin.  December 20 he developed acute chest pain and went to the emergency department.  His cardiac work-up was negative he was treated with pantoprazole and GI cocktail with relief.  He was discharged and continue to take pantoprazole but had increased symptoms.  His pantoprazole dose was increased to 40 mg daily.  After increasing his dose he had increased fatigue, malaise, and intermittent blurry vision.  He continues to have symptoms that waxed and waned over the month.  He was recommended for EGD but did not feel comfortable discontinuing his Plavix as it had not been a year since his stent placement.  His symptoms of fatigue and malaise became severe in February.  He went to the emergency room twice.  His troponins were elevated however these decreased.  This past Saturday he did test positive for Covid.  His ALT is mildly elevated.  He did have a contrasted CT of his abdomen and pelvis with fatty liver, normal gallbladder, normal pancreas, normal bowel, along with mild constipation.  During this work-up in early February he was noted to have some black stools.  He today his main complaint is fatigue, malaise, dyspepsia was taking Pepto-Bismol initially however he discontinued this and the black stools continued.  His blood work shows no anemia and his BUN is normal..  He is only using the pantoprazole as needed.  He did test positive for Covid this past Saturday.  Today he has multiple systemic complaints and is not feeling well.  MB 4/15/2022 Kenn presents for follow-up of reflux, nausea, abdominal pain, and melena.  Since his last visit we tried multiple PPIs and he felt that he reacted to most of these.  His dyspepsia and epigastric pain improved on high-dose Protonix but he felt that he had systemic symptoms.  Since his last visit he did dose reduce his statin, his fatigue and malaise has improved.  He has had no further melena.  He remains on aspirin and Plavix for coronary artery disease and hopefully going to be able to come off in August 2022.  His bowels are still somewhat irregular.  We have discussed that this may be a side effect of the medications that he has to take for his heart.  Today he is feeling better than he has in some time.  He does plan to establish care locally with cardiology and Dr. Hemphill.  He does want to discuss EGD and colonoscopy at his follow-up visit.  MB 6/21/2022 Kenn presents for evaluation of increased epigastric abdominal pain and reflux.  A month ago he developed a sinus infection.  He was taking over-the-counter sinus medications and developed increased epigastric abdominal pain and dizziness.  He was finally treated with antibiotics for his sinus infection but he continues to have epigastric discomfort.  He started pantoprazole 40 mg daily and over the past 3 days has increased to twice daily.  He does think this is helped his symptoms.  He does report increased fatigue and dizziness on the higher dose.  He is 2 months away from his 1 year anniversary of his stent placement.  He does not want to consider pursuing upper endoscopy given his chronic upper GI complaints.  He agrees to wean the pantoprazole to 20 mg twice daily to try and reduce the dizziness and proceed with EGD once Dr. Hemphill has cleared him to come off of his Plavix.  MB 2/27/2023 Kenn presents for endoscopy follow-up.  Since his last visit he underwent endoscopy in the fall 2022.  This reveals mild gastritis.  He was down to pantoprazole 20 mg daily.  This medication gives him severe fatigue but it normalizes his bowels.  He got another sinus infection which seems to always give him significant GI upset, he flared with gastritis symptoms including epigastric abdominal pain belching and reflux.  He is back on pantoprazole 40 mg twice daily with improvement in his GI symptoms but severe brain fog.  He states when he is off PPI his bowels are loose, when he is on PPI his bowels move daily.  Today his main complaint is dyspepsia which he relates to antibiotics from his most recent sinus infection.  He has not seen an ENT for recurrent sinusitis.  He has never had a colonoscopy in the past.  We will schedule colonoscopy with intubation of his TI and random biopsies given his bowel irregularity off of PPI.  We have discussed that typically PPI causes diarrhea.  Overall he continues to struggle with his chronic GI complaints in particular dyspepsia and bowel irregularity.  He does still have his gallbladder.  MB 8/21/2023 Kenn presents for follow-up of reflux, gastritis, right upper quadrant abdominal pain, and colon cancer screening.  Since his last visit his colonoscopy reveals 1 precancerous polyp and normal random biopsies, he does have left-sided diverticulosis.  His bowels have improved.  He has had intermittent right upper quadrant abdominal pain.  His CBC CMP and ultrasound are normal since his last visit.  He had another flare of an upper respiratory infection which flares his reflux.  He increased his pantoprazole to 40 mg twice daily but this causes lower extremity edema and severe muscle aches.  He is back down to 20 mg twice daily but having breakthrough reflux, he has failed all other PPI therapy including as omeprazole, lansoprazole, omeprazole.  He does agree to start famotidine 20 mg twice daily before lunch and bedtime.  We have discussed elevating the head of his bed as refluxing at night as his main complaint.  Today he continues to struggle with his GI complaints.  He does follow with Dr. Hemphill, they have discussed an alternative to aspirin therapy which is an injection.  He does want to consider this as he feels like the majority of his upper GI complaints have been driven by aspirin therapy since his MI.  Today he is doing well otherwise with no new GI complaints.  MB 2/12/2024 Kenn presents for follow-up of reflux.  Since his last visit he has had a flare after janett flu B last month.  The pantoprazole at the higher dose seems to give him a rash.  He switched over to lansoprazole 15 mg twice daily and has been taking Pepcid as needed.  He still having heartburn with regurgitation and reflux.  He agrees to go to high-dose PPI with lansoprazole and famotidine twice daily scheduled for 1 month, then try to wean back down.  Consider vocalizing if no relief.  MB

## 2024-05-03 ENCOUNTER — WEB ENCOUNTER (OUTPATIENT)
Dept: URBAN - NONMETROPOLITAN AREA CLINIC 2 | Facility: CLINIC | Age: 54
End: 2024-05-03

## 2024-05-04 ENCOUNTER — WEB ENCOUNTER (OUTPATIENT)
Dept: URBAN - NONMETROPOLITAN AREA CLINIC 2 | Facility: CLINIC | Age: 54
End: 2024-05-04

## 2024-05-08 ENCOUNTER — WEB ENCOUNTER (OUTPATIENT)
Dept: URBAN - NONMETROPOLITAN AREA CLINIC 2 | Facility: CLINIC | Age: 54
End: 2024-05-08

## 2024-05-29 ENCOUNTER — OFFICE VISIT (OUTPATIENT)
Dept: URBAN - NONMETROPOLITAN AREA CLINIC 13 | Facility: CLINIC | Age: 54
End: 2024-05-29

## 2024-07-26 ENCOUNTER — OFFICE VISIT (OUTPATIENT)
Dept: URBAN - NONMETROPOLITAN AREA CLINIC 2 | Facility: CLINIC | Age: 54
End: 2024-07-26

## 2024-08-06 ENCOUNTER — OFFICE VISIT (OUTPATIENT)
Dept: URBAN - NONMETROPOLITAN AREA CLINIC 2 | Facility: CLINIC | Age: 54
End: 2024-08-06

## 2024-08-12 ENCOUNTER — OFFICE VISIT (OUTPATIENT)
Dept: URBAN - NONMETROPOLITAN AREA CLINIC 2 | Facility: CLINIC | Age: 54
End: 2024-08-12

## 2024-11-11 ENCOUNTER — OFFICE VISIT (OUTPATIENT)
Dept: URBAN - NONMETROPOLITAN AREA CLINIC 2 | Facility: CLINIC | Age: 54
End: 2024-11-11
Payer: COMMERCIAL

## 2024-11-11 ENCOUNTER — DASHBOARD ENCOUNTERS (OUTPATIENT)
Age: 54
End: 2024-11-11

## 2024-11-11 ENCOUNTER — LAB OUTSIDE AN ENCOUNTER (OUTPATIENT)
Dept: URBAN - NONMETROPOLITAN AREA CLINIC 2 | Facility: CLINIC | Age: 54
End: 2024-11-11

## 2024-11-11 VITALS
BODY MASS INDEX: 29.29 KG/M2 | HEIGHT: 73 IN | DIASTOLIC BLOOD PRESSURE: 93 MMHG | HEART RATE: 71 BPM | WEIGHT: 221 LBS | SYSTOLIC BLOOD PRESSURE: 144 MMHG

## 2024-11-11 DIAGNOSIS — Z12.11 COLON CANCER SCREENING: ICD-10-CM

## 2024-11-11 DIAGNOSIS — R53.83 FATIGUE, UNSPECIFIED TYPE: ICD-10-CM

## 2024-11-11 DIAGNOSIS — R10.11 RUQ ABDOMINAL PAIN: ICD-10-CM

## 2024-11-11 DIAGNOSIS — R19.4 CHANGE IN BOWEL HABITS: ICD-10-CM

## 2024-11-11 DIAGNOSIS — R11.0 NAUSEA: ICD-10-CM

## 2024-11-11 DIAGNOSIS — R74.8 ELEVATED LIVER ENZYMES: ICD-10-CM

## 2024-11-11 DIAGNOSIS — K21.9 GASTROESOPHAGEAL REFLUX DISEASE, UNSPECIFIED WHETHER ESOPHAGITIS PRESENT: ICD-10-CM

## 2024-11-11 PROCEDURE — 99214 OFFICE O/P EST MOD 30 MIN: CPT | Performed by: NURSE PRACTITIONER

## 2024-11-11 RX ORDER — PANTOPRAZOLE SODIUM 40 MG/1
1 TABLET TABLET, DELAYED RELEASE ORAL ONCE A DAY
Qty: 90 TABLET | Refills: 3 | Status: ON HOLD | COMMUNITY

## 2024-11-11 RX ORDER — ESOMEPRAZOLE MAGNESIUM 20 MG/1
1 CAPSULE CAPSULE, DELAYED RELEASE ORAL BID
Qty: 180 CAPSULE | Refills: 3 | Status: ON HOLD | COMMUNITY
Start: 2022-02-18

## 2024-11-11 RX ORDER — LANSOPRAZOLE 15 MG/1
1 CAPSULE BEFORE A MEAL CAPSULE, DELAYED RELEASE ORAL
Status: ACTIVE | COMMUNITY

## 2024-11-11 RX ORDER — ROSUVASTATIN 40 MG/1
1 TABLET TABLET, FILM COATED ORAL ONCE A DAY
Status: ACTIVE | COMMUNITY

## 2024-11-11 RX ORDER — FAMOTIDINE 40 MG/1
1 TABLET AT BEDTIME TABLET, FILM COATED ORAL ONCE A DAY
Qty: 90 TABLET | Refills: 3 | Status: ACTIVE | COMMUNITY
Start: 2023-08-21

## 2024-11-11 RX ORDER — LANSOPRAZOLE 30 MG/1
1 CAPSULE BEFORE A MEAL CAPSULE, DELAYED RELEASE ORAL TWICE DAILY
Qty: 180 CAPSULES | Refills: 3 | Status: ACTIVE | COMMUNITY
Start: 2022-02-22

## 2024-11-11 NOTE — HPI-TODAY'S VISIT:
Kenn is a 51-year-old male who is referred to me by Dr. Matt Chavez for consultation of nausea, melena, fatigue, and elevated liver enzymes.  A copy of this note will be sent to the referring physician.  In August 2021 he had an MI in the LAD status post stent placement at [facility].  He is on Plavix and baby aspirin.  December 20 he developed acute chest pain and went to the emergency department.  His cardiac work-up was negative he was treated with pantoprazole and GI cocktail with relief.  He was discharged and continue to take pantoprazole but had increased symptoms.  His pantoprazole dose was increased to 40 mg daily.  After increasing his dose he had increased fatigue, malaise, and intermittent blurry vision.  He continues to have symptoms that waxed and waned over the month.  He was recommended for EGD but did not feel comfortable discontinuing his Plavix as it had not been a year since his stent placement.  His symptoms of fatigue and malaise became severe in February.  He went to the emergency room twice.  His troponins were elevated however these decreased.  This past Saturday he did test positive for Covid.  His ALT is mildly elevated.  He did have a contrasted CT of his abdomen and pelvis with fatty liver, normal gallbladder, normal pancreas, normal bowel, along with mild constipation.  During this work-up in early February he was noted to have some black stools.  He today his main complaint is fatigue, malaise, dyspepsia was taking Pepto-Bismol initially however he discontinued this and the black stools continued.  His blood work shows no anemia and his BUN is normal..  He is only using the pantoprazole as needed.  He did test positive for Covid this past Saturday.  Today he has multiple systemic complaints and is not feeling well.  MB 4/15/2022 Kenn presents for follow-up of reflux, nausea, abdominal pain, and melena.  Since his last visit we tried multiple PPIs and he felt that he reacted to most of these.  His dyspepsia and epigastric pain improved on high-dose Protonix but he felt that he had systemic symptoms.  Since his last visit he did dose reduce his statin, his fatigue and malaise has improved.  He has had no further melena.  He remains on aspirin and Plavix for coronary artery disease and hopefully going to be able to come off in August 2022.  His bowels are still somewhat irregular.  We have discussed that this may be a side effect of the medications that he has to take for his heart.  Today he is feeling better than he has in some time.  He does plan to establish care locally with cardiology and Dr. Hemphill.  He does want to discuss EGD and colonoscopy at his follow-up visit.  MB 6/21/2022 Kenn presents for evaluation of increased epigastric abdominal pain and reflux.  A month ago he developed a sinus infection.  He was taking over-the-counter sinus medications and developed increased epigastric abdominal pain and dizziness.  He was finally treated with antibiotics for his sinus infection but he continues to have epigastric discomfort.  He started pantoprazole 40 mg daily and over the past 3 days has increased to twice daily.  He does think this is helped his symptoms.  He does report increased fatigue and dizziness on the higher dose.  He is 2 months away from his 1 year anniversary of his stent placement.  He does not want to consider pursuing upper endoscopy given his chronic upper GI complaints.  He agrees to wean the pantoprazole to 20 mg twice daily to try and reduce the dizziness and proceed with EGD once Dr. Hemphill has cleared him to come off of his Plavix.  MB 2/27/2023 Kenn presents for endoscopy follow-up.  Since his last visit he underwent endoscopy in the fall 2022.  This reveals mild gastritis.  He was down to pantoprazole 20 mg daily.  This medication gives him severe fatigue but it normalizes his bowels.  He got another sinus infection which seems to always give him significant GI upset, he flared with gastritis symptoms including epigastric abdominal pain belching and reflux.  He is back on pantoprazole 40 mg twice daily with improvement in his GI symptoms but severe brain fog.  He states when he is off PPI his bowels are loose, when he is on PPI his bowels move daily.  Today his main complaint is dyspepsia which he relates to antibiotics from his most recent sinus infection.  He has not seen an ENT for recurrent sinusitis.  He has never had a colonoscopy in the past.  We will schedule colonoscopy with intubation of his TI and random biopsies given his bowel irregularity off of PPI.  We have discussed that typically PPI causes diarrhea.  Overall he continues to struggle with his chronic GI complaints in particular dyspepsia and bowel irregularity.  He does still have his gallbladder.  MB 8/21/2023 Kenn presents for follow-up of reflux, gastritis, right upper quadrant abdominal pain, and colon cancer screening.  Since his last visit his colonoscopy reveals 1 precancerous polyp and normal random biopsies, he does have left-sided diverticulosis.  His bowels have improved.  He has had intermittent right upper quadrant abdominal pain.  His CBC CMP and ultrasound are normal since his last visit.  He had another flare of an upper respiratory infection which flares his reflux.  He increased his pantoprazole to 40 mg twice daily but this causes lower extremity edema and severe muscle aches.  He is back down to 20 mg twice daily but having breakthrough reflux, he has failed all other PPI therapy including as omeprazole, lansoprazole, omeprazole.  He does agree to start famotidine 20 mg twice daily before lunch and bedtime.  We have discussed elevating the head of his bed as refluxing at night as his main complaint.  Today he continues to struggle with his GI complaints.  He does follow with Dr. Hemphill, they have discussed an alternative to aspirin therapy which is an injection.  He does want to consider this as he feels like the majority of his upper GI complaints have been driven by aspirin therapy since his MI.  Today he is doing well otherwise with no new GI complaints.  MB 2/12/2024 Kenn presents for follow-up of reflux.  Since his last visit he has had a flare after janett flu B last month.  The pantoprazole at the higher dose seems to give him a rash.  He switched over to lansoprazole 15 mg twice daily and has been taking Pepcid as needed.  He still having heartburn with regurgitation and reflux.  He agrees to go to high-dose PPI with lansoprazole and famotidine twice daily scheduled for 1 month, then try to wean back down.  Consider vocalizing if no relief.  MB 11/11/2024 Kenn presents for follow-up.  Since his last visit he has been doing fairly well.  He takes lansoprazole for 2 to 3 days and then has to take a break.  If he takes it daily he develops chest tightness severe fatigue.  He will take a break and then restart the medication.  This manages his GI symptoms well, he has had an extensive cardiac workup with no further cardiac at etiology of those symptoms.  He does relate it directly to PPI but does feel like the lansoprazole helps the most.  We will check a magnesium level.  He uses famotidine as needed for breakthrough on the days he is off PPI, we have also discussed he can take Gaviscon for dyspepsia.  He does have a history of fatty liver, we will repeat his labs, we will check a FibroScan.  His brother has fatty liver and his dad has cirrhosis in his 70s.  Otherwise today he is actually doing fairly well from a GI standpoint.  MB

## 2024-11-12 LAB
A/G RATIO: 2
ABSOLUTE BASOPHILS: 61
ABSOLUTE EOSINOPHILS: 70
ABSOLUTE LYMPHOCYTES: 1505
ABSOLUTE MONOCYTES: 644
ABSOLUTE NEUTROPHILS: 6421
ALBUMIN: 4.9
ALKALINE PHOSPHATASE: 54
ALT (SGPT): 37
AST (SGOT): 26
BASOPHILS: 0.7
BILIRUBIN, TOTAL: 0.6
BUN/CREATININE RATIO: (no result)
BUN: 13
CALCIUM: 10.2
CARBON DIOXIDE, TOTAL: 26
CHLORIDE: 102
CREATININE: 1.27
EGFR: 68
EOSINOPHILS: 0.8
GLOBULIN, TOTAL: 2.4
GLUCOSE: 86
HEMATOCRIT: 47.4
HEMOGLOBIN: 15.9
LYMPHOCYTES: 17.3
MAGNESIUM: 2.1
MCH: 30.2
MCHC: 33.5
MCV: 89.9
MONOCYTES: 7.4
MPV: 12.1
NEUTROPHILS: 73.8
PLATELET COUNT: 266
POTASSIUM: 4.7
PROTEIN, TOTAL: 7.3
RDW: 12.7
RED BLOOD CELL COUNT: 5.27
SODIUM: 139
WHITE BLOOD CELL COUNT: 8.7

## 2024-12-05 ENCOUNTER — TELEPHONE ENCOUNTER (OUTPATIENT)
Dept: URBAN - NONMETROPOLITAN AREA CLINIC 2 | Facility: CLINIC | Age: 54
End: 2024-12-05

## 2024-12-05 ENCOUNTER — LAB OUTSIDE AN ENCOUNTER (OUTPATIENT)
Dept: URBAN - NONMETROPOLITAN AREA CLINIC 2 | Facility: CLINIC | Age: 54
End: 2024-12-05

## 2025-05-12 ENCOUNTER — OFFICE VISIT (OUTPATIENT)
Dept: URBAN - NONMETROPOLITAN AREA CLINIC 2 | Facility: CLINIC | Age: 55
End: 2025-05-12